# Patient Record
Sex: MALE | Race: WHITE | Employment: STUDENT | URBAN - METROPOLITAN AREA
[De-identification: names, ages, dates, MRNs, and addresses within clinical notes are randomized per-mention and may not be internally consistent; named-entity substitution may affect disease eponyms.]

---

## 2019-12-31 ENCOUNTER — OFFICE VISIT (OUTPATIENT)
Dept: URGENT CARE | Facility: URGENT CARE | Age: 19
End: 2019-12-31
Payer: COMMERCIAL

## 2019-12-31 VITALS
HEIGHT: 74 IN | SYSTOLIC BLOOD PRESSURE: 133 MMHG | RESPIRATION RATE: 18 BRPM | TEMPERATURE: 97.3 F | DIASTOLIC BLOOD PRESSURE: 86 MMHG | OXYGEN SATURATION: 97 % | BODY MASS INDEX: 23.1 KG/M2 | WEIGHT: 180 LBS | HEART RATE: 101 BPM

## 2019-12-31 DIAGNOSIS — J18.9 WALKING PNEUMONIA: Primary | ICD-10-CM

## 2019-12-31 PROCEDURE — 99203 OFFICE O/P NEW LOW 30 MIN: CPT | Performed by: FAMILY MEDICINE

## 2019-12-31 RX ORDER — DOXYCYCLINE 100 MG/1
100 CAPSULE ORAL 2 TIMES DAILY
Qty: 28 CAPSULE | Refills: 0 | Status: SHIPPED | OUTPATIENT
Start: 2019-12-31 | End: 2020-01-14

## 2019-12-31 RX ORDER — ALBUTEROL SULFATE 90 UG/1
1-2 AEROSOL, METERED RESPIRATORY (INHALATION) EVERY 4 HOURS PRN
Qty: 1 INHALER | Refills: 0 | Status: SHIPPED | OUTPATIENT
Start: 2019-12-31

## 2019-12-31 ASSESSMENT — MIFFLIN-ST. JEOR: SCORE: 1891.22

## 2019-12-31 NOTE — PATIENT INSTRUCTIONS
Patient Education     Walking Pneumonia (Mycoplasma Pneumonia)  What is walking pneumonia?    Pneumonia is an infection of one or both of the lungs. It's usually caused by a virus, or bacteria. Walking pneumonia is caused by a specific bacteria called mycoplasma. Pneumonia can be very serious, especially in infants, young children, and older adults. And also in those with other long-term health problems or weak immune systems. In otherwise healthy adults, pneumonia can be mild. Mycoplasma pneumonia is a mild form and is often called walking pneumonia.  What are the symptoms of walking pneumonia?  The most common symptom is a dry, hacking cough. You may also feel tired.  Other symptoms may include:    Fever    Headaches    Chills    Sweating    Chest pain    Ear pain    Sore throat  How is walking pneumonia diagnosed?  Your healthcare provider will ask about your medical history and current symptoms. He or she will also examine you. You may also have tests including:    Imaging. A chest X-ray of your chest may be done.    Lab tests. Blood tests and sputum cultures may be done.  How is walking pneumonia treated?  Since it's caused by bacteria, antibiotics are usually prescribed. However, it may also go away without any treatment.  Your healthcare provider may also recommend medicines, either prescription or over-the-counter, and other measures to relieve symptoms such as:    Acetaminophen or ibuprofen to lower your fever and lessen headache or other pain    Cough medicine to loosen mucus or reduce coughing    Bedrest or reduced activity    Increased fluids to loosen mucus and replace lost fluids from sweating  Make sure you check with your healthcare provider or pharmacist before taking any over-the-counter medicines.  What are the complications of walking pneumonia?  Although walking pneumonia is usually mild, and it often goes away without treatment, complications can occur. They include:    Severe  pneumonia    Serious infections in other parts of the body    Anemia or a low red blood cell count    Kidney problems    Skin conditions  What can I do to prevent walking pneumonia?  To prevent others from getting walking pneumonia:    Try to stay away from other people if you are coughing a lot.    Cover your mouth when you cough. It's easy to pass along this infection to other people through coughing, and contact with surfaces that you cough near.    Wipe off surfaces, including phones, remote controls, and doorknobs with antibacterial or disinfectant products.    Tell people to wash their hands if they touch things you have coughed near.    Make sure to wash your hands often. Wash them before handling food or objects that others may touch. Use a separate towel or paper towels for drying.  Date Last Reviewed: 1/1/2017 2000-2018 [email or web address]. [address] [place] [address], [place] [address]. All rights reserved. This information is not intended as a substitute for professional medical care. Always follow your healthcare professional's instructions.

## 2019-12-31 NOTE — LETTER
[facility]  [address] [address]  [phone]      December 31, 2019    RE:  Paul Blakely                                                                                                                                                       [address] [place] [address]            To whom it may concern:    Paul Blakely is under my professional care for Walking pneumonia.   He  may travel on an airplane with his inhaler and antibiotic      Sincerely,        Anel Alexis MD    [facility]

## 2019-12-31 NOTE — PROGRESS NOTES
"SUBJECTIVE:  Chief Complaint   Patient presents with     Urgent Care     URI     Pt states cough, yellow mucus, SOB sxs 7x months      Paul Blakely is a 19 year old male who presents to the clinic today with a chief complaint of cough  and central chest pain. for 7 month(s).  Patient denies shortness of breath. and pleuritic chest pain  His cough is described as persistent, daytime, nightime and productive blood tinged and tenacious.    The patient's symptoms are moderate and not changing over the course of time.  Associated symptoms include malaise. The patient's symptoms are exacerbated by exercise  Patient has been using OTC cough suppressants  to improve symptoms.    PMH:  No history of chronic health conditions    ALLERGIES:  Patient has no known allergies.    MEDs  No current outpatient medications on file prior to visit.  No current facility-administered medications on file prior to visit.       Social History     Tobacco Use     Smoking status: Never Smoker     Smokeless tobacco: Never Used   Substance Use Topics     Alcohol use: Not on file       Family History:  Non-contributory,  No associated family health conditions    ROS  CONSTITUTIONAL:NEGATIVE for fever, chills,    INTEGUMENTARY/SKIN: NEGATIVE for worrisome rashes,   or lesions  EYES: NEGATIVE for vision changes or irritation  GI: NEGATIVE for nausea, abdominal pain,  , or change in bowel habits    OBJECTIVE:  /86   Pulse 101   Temp 97.3  F (36.3  C) (Oral)   Resp 18   Ht 1.88 m (6' 2\")   Wt 81.6 kg (180 lb)   SpO2 97%   BMI 23.11 kg/m    GENERAL APPEARANCE: alert, mild distress and cooperative  EYES: EOMI,  PERRL, conjunctiva clear  HENT: ear canals and TM's normal.  Nose and mouth without ulcers, erythema or lesions  NECK: supple, nontender, no lymphadenopathy  RESP: lungs clear to auscultation - no rales, rhonchi or wheezes  CV: regular rates and rhythm, normal S1 S2, no murmur noted  ABDOMEN:  soft, nontender, no HSM or masses and " bowel sounds normal  NEURO: Normal strength and tone, sensory exam grossly normal,  normal speech and mentation  SKIN: no suspicious lesions or rashes       ASSESSMENT:    Walking pneumonia      - doxycycline hyclate (VIBRAMYCIN) 100 MG capsule; Take 1 capsule (100 mg) by mouth 2 times daily for 14 days  - albuterol (PROAIR HFA/PROVENTIL HFA/VENTOLIN HFA) 108 (90 Base) MCG/ACT inhaler; Inhale 1-2 puffs into the lungs every 4 hours as needed for shortness of breath / dyspnea or wheezing     We discussed that the patient's symptoms most closely fit the pattern of a walking pneumonia or mycoplasma pneumonitis with a prolonged, low level respiratory infection involving both lungs. Not much fevers or chills,  Thick, tenacious pulmonary secretions that are difficult to clear with coughing.      We discussed that no antibiotic kills mycoplasma, and only certain antibiotics will slow the multiplication of the organism, so more prolonged antibiotic treatment is often necessary    Use of an albuterol inhaler and OTC expectorant can be helpful to open the airways to help clear the thick secretions from the airways.       Symptomatic measures encouraged, humidified air, plenty of fluids.  Patient may consider OTC expectorant and/or cough suppressant to treat symptoms.  Return if worsening

## 2021-10-01 ENCOUNTER — APPOINTMENT (OUTPATIENT)
Dept: URBAN - METROPOLITAN AREA CLINIC 259 | Age: 21
Setting detail: DERMATOLOGY
End: 2021-10-04

## 2021-10-01 DIAGNOSIS — R20.8 OTHER DISTURBANCES OF SKIN SENSATION: ICD-10-CM

## 2021-10-01 DIAGNOSIS — L05.91 PILONIDAL CYST WITHOUT ABSCESS: ICD-10-CM

## 2021-10-01 PROCEDURE — OTHER PRESCRIPTION MEDICATION MANAGEMENT: OTHER

## 2021-10-01 PROCEDURE — OTHER INTRALESIONAL KENALOG: OTHER

## 2021-10-01 PROCEDURE — OTHER MIPS QUALITY: OTHER

## 2021-10-01 PROCEDURE — OTHER COUNSELING: OTHER

## 2021-10-01 PROCEDURE — OTHER PRESCRIPTION: OTHER

## 2021-10-01 PROCEDURE — 99202 OFFICE O/P NEW SF 15 MIN: CPT | Mod: 25

## 2021-10-01 PROCEDURE — 11900 INJECT SKIN LESIONS </W 7: CPT

## 2021-10-01 RX ORDER — CEPHALEXIN 500 MG/1
TABLET ORAL BID
Qty: 28 | Refills: 0 | Status: ERX | COMMUNITY
Start: 2021-10-01

## 2021-10-01 ASSESSMENT — LOCATION DETAILED DESCRIPTION DERM: LOCATION DETAILED: INFERIOR LUMBAR SPINE

## 2021-10-01 ASSESSMENT — LOCATION ZONE DERM: LOCATION ZONE: TRUNK

## 2021-10-01 ASSESSMENT — LOCATION SIMPLE DESCRIPTION DERM: LOCATION SIMPLE: LOWER BACK

## 2021-10-01 NOTE — PROCEDURE: MIPS QUALITY
Quality 431: Preventive Care And Screening: Unhealthy Alcohol Use - Screening: Patient not identified as an unhealthy alcohol user when screened for unhealthy alcohol use using a systematic screening method
Detail Level: Detailed
Quality 130: Documentation Of Current Medications In The Medical Record: Current Medications Documented
Quality 110: Preventive Care And Screening: Influenza Immunization: Influenza Immunization Ordered or Recommended, but not Administered due to system reason
Quality 226: Preventive Care And Screening: Tobacco Use: Screening And Cessation Intervention: Patient screened for tobacco use and is an ex/non-smoker

## 2021-10-01 NOTE — PROCEDURE: INTRALESIONAL KENALOG
Concentration Of Solution Injected (Mg/Ml): 10.0
Validate Note Data When Using Inventory: Yes
Include Z78.9 (Other Specified Conditions Influencing Health Status) As An Associated Diagnosis?: No
Kenalog Preparation: Kenalog
Detail Level: Simple
Consent: The risks of atrophy were reviewed with the patient.
X Size Of Lesion In Cm (Optional): 0
Total Volume Injected (Ccs- Only Use Numbers And Decimals): 0.8
Medical Necessity Clause: This procedure was medically necessary because the lesions that were treated were:

## 2021-10-01 NOTE — PROCEDURE: COUNSELING
Detail Level: Simple
Patient Specific Counseling (Will Not Stick From Patient To Patient): Recommended referral to colorectal surgery and we will treat with ILK and antibiotics.
Detail Level: Detailed

## 2021-10-01 NOTE — PROCEDURE: PRESCRIPTION MEDICATION MANAGEMENT
Plan: Discussed with patient patient that he should follow up with general surgery for this. Patient was given Name of Dr. Willie Walden at [facility], Fax : [phone] Plan: Discussed with patient patient that he should follow up with general surgery for this. Patient was given Name of Dr. Willie Walden at [facility], Fax : [phone]